# Patient Record
Sex: MALE | Race: OTHER | URBAN - METROPOLITAN AREA
[De-identification: names, ages, dates, MRNs, and addresses within clinical notes are randomized per-mention and may not be internally consistent; named-entity substitution may affect disease eponyms.]

---

## 2024-07-31 ENCOUNTER — EMERGENCY (EMERGENCY)
Facility: HOSPITAL | Age: 54
LOS: 0 days | Discharge: HOME | End: 2024-07-31
Attending: STUDENT IN AN ORGANIZED HEALTH CARE EDUCATION/TRAINING PROGRAM
Payer: MEDICAID

## 2024-07-31 VITALS
OXYGEN SATURATION: 99 % | SYSTOLIC BLOOD PRESSURE: 120 MMHG | TEMPERATURE: 98 F | HEIGHT: 71 IN | DIASTOLIC BLOOD PRESSURE: 82 MMHG | HEART RATE: 77 BPM | RESPIRATION RATE: 16 BRPM | WEIGHT: 195.11 LBS

## 2024-07-31 VITALS
HEART RATE: 66 BPM | RESPIRATION RATE: 18 BRPM | DIASTOLIC BLOOD PRESSURE: 82 MMHG | SYSTOLIC BLOOD PRESSURE: 126 MMHG | OXYGEN SATURATION: 99 %

## 2024-07-31 DIAGNOSIS — E11.9 TYPE 2 DIABETES MELLITUS WITHOUT COMPLICATIONS: ICD-10-CM

## 2024-07-31 DIAGNOSIS — R50.9 FEVER, UNSPECIFIED: ICD-10-CM

## 2024-07-31 DIAGNOSIS — Z79.01 LONG TERM (CURRENT) USE OF ANTICOAGULANTS: ICD-10-CM

## 2024-07-31 DIAGNOSIS — I10 ESSENTIAL (PRIMARY) HYPERTENSION: ICD-10-CM

## 2024-07-31 DIAGNOSIS — E78.5 HYPERLIPIDEMIA, UNSPECIFIED: ICD-10-CM

## 2024-07-31 DIAGNOSIS — Z89.431 ACQUIRED ABSENCE OF RIGHT FOOT: ICD-10-CM

## 2024-07-31 DIAGNOSIS — I25.10 ATHEROSCLEROTIC HEART DISEASE OF NATIVE CORONARY ARTERY WITHOUT ANGINA PECTORIS: ICD-10-CM

## 2024-07-31 DIAGNOSIS — T81.31XA DISRUPTION OF EXTERNAL OPERATION (SURGICAL) WOUND, NOT ELSEWHERE CLASSIFIED, INITIAL ENCOUNTER: ICD-10-CM

## 2024-07-31 DIAGNOSIS — L08.9 LOCAL INFECTION OF THE SKIN AND SUBCUTANEOUS TISSUE, UNSPECIFIED: ICD-10-CM

## 2024-07-31 LAB
ALBUMIN SERPL ELPH-MCNC: 2.6 G/DL — LOW (ref 3.3–5)
ALP SERPL-CCNC: 62 U/L — SIGNIFICANT CHANGE UP (ref 40–120)
ALT FLD-CCNC: 14 U/L — SIGNIFICANT CHANGE UP (ref 12–78)
ANION GAP SERPL CALC-SCNC: 4 MMOL/L — LOW (ref 5–17)
ANISOCYTOSIS BLD QL: SLIGHT — SIGNIFICANT CHANGE UP
AST SERPL-CCNC: 15 U/L — SIGNIFICANT CHANGE UP (ref 15–37)
BASOPHILS # BLD AUTO: 0.01 K/UL — SIGNIFICANT CHANGE UP (ref 0–0.2)
BASOPHILS NFR BLD AUTO: 0.2 % — SIGNIFICANT CHANGE UP (ref 0–2)
BILIRUB SERPL-MCNC: 0.3 MG/DL — SIGNIFICANT CHANGE UP (ref 0.2–1.2)
BUN SERPL-MCNC: 23 MG/DL — SIGNIFICANT CHANGE UP (ref 7–23)
CALCIUM SERPL-MCNC: 9 MG/DL — SIGNIFICANT CHANGE UP (ref 8.5–10.1)
CHLORIDE SERPL-SCNC: 106 MMOL/L — SIGNIFICANT CHANGE UP (ref 96–108)
CO2 SERPL-SCNC: 28 MMOL/L — SIGNIFICANT CHANGE UP (ref 22–31)
CREAT SERPL-MCNC: 1.27 MG/DL — SIGNIFICANT CHANGE UP (ref 0.5–1.3)
CRP SERPL-MCNC: 19 MG/L — HIGH
EGFR: 67 ML/MIN/1.73M2 — SIGNIFICANT CHANGE UP
EOSINOPHIL # BLD AUTO: 0.13 K/UL — SIGNIFICANT CHANGE UP (ref 0–0.5)
EOSINOPHIL NFR BLD AUTO: 2.2 % — SIGNIFICANT CHANGE UP (ref 0–6)
ERYTHROCYTE [SEDIMENTATION RATE] IN BLOOD: 90 MM/HR — HIGH (ref 0–20)
GLUCOSE SERPL-MCNC: 175 MG/DL — HIGH (ref 70–99)
HCT VFR BLD CALC: 27 % — LOW (ref 39–50)
HGB BLD-MCNC: 8.5 G/DL — LOW (ref 13–17)
HYPOCHROMIA BLD QL: SLIGHT — SIGNIFICANT CHANGE UP
IMM GRANULOCYTES NFR BLD AUTO: 0.2 % — SIGNIFICANT CHANGE UP (ref 0–0.9)
LYMPHOCYTES # BLD AUTO: 1.41 K/UL — SIGNIFICANT CHANGE UP (ref 1–3.3)
LYMPHOCYTES # BLD AUTO: 24.4 % — SIGNIFICANT CHANGE UP (ref 13–44)
MACROCYTES BLD QL: SLIGHT — SIGNIFICANT CHANGE UP
MANUAL SMEAR VERIFICATION: SIGNIFICANT CHANGE UP
MCHC RBC-ENTMCNC: 23 PG — LOW (ref 27–34)
MCHC RBC-ENTMCNC: 31.5 G/DL — LOW (ref 32–36)
MCV RBC AUTO: 73.2 FL — LOW (ref 80–100)
MONOCYTES # BLD AUTO: 0.64 K/UL — SIGNIFICANT CHANGE UP (ref 0–0.9)
MONOCYTES NFR BLD AUTO: 11.1 % — SIGNIFICANT CHANGE UP (ref 2–14)
NEUTROPHILS # BLD AUTO: 3.59 K/UL — SIGNIFICANT CHANGE UP (ref 1.8–7.4)
NEUTROPHILS NFR BLD AUTO: 61.9 % — SIGNIFICANT CHANGE UP (ref 43–77)
NRBC # BLD: 0 /100 WBCS — SIGNIFICANT CHANGE UP (ref 0–0)
PLAT MORPH BLD: NORMAL — SIGNIFICANT CHANGE UP
PLATELET # BLD AUTO: 267 K/UL — SIGNIFICANT CHANGE UP (ref 150–400)
POTASSIUM SERPL-MCNC: 3.9 MMOL/L — SIGNIFICANT CHANGE UP (ref 3.5–5.3)
POTASSIUM SERPL-SCNC: 3.9 MMOL/L — SIGNIFICANT CHANGE UP (ref 3.5–5.3)
PROT SERPL-MCNC: 7.9 GM/DL — SIGNIFICANT CHANGE UP (ref 6–8.3)
RBC # BLD: 3.69 M/UL — LOW (ref 4.2–5.8)
RBC # FLD: 17.7 % — HIGH (ref 10.3–14.5)
RBC BLD AUTO: SIGNIFICANT CHANGE UP
SODIUM SERPL-SCNC: 138 MMOL/L — SIGNIFICANT CHANGE UP (ref 135–145)
WBC # BLD: 5.79 K/UL — SIGNIFICANT CHANGE UP (ref 3.8–10.5)
WBC # FLD AUTO: 5.79 K/UL — SIGNIFICANT CHANGE UP (ref 3.8–10.5)

## 2024-07-31 PROCEDURE — 99285 EMERGENCY DEPT VISIT HI MDM: CPT

## 2024-07-31 PROCEDURE — 73630 X-RAY EXAM OF FOOT: CPT | Mod: 26,RT

## 2024-07-31 RX ORDER — AMOXICILLIN/POTASSIUM CLAV 250-125 MG
875 TABLET ORAL
Qty: 20 | Refills: 0
Start: 2024-07-31 | End: 2024-08-09

## 2024-07-31 NOTE — ED ADULT TRIAGE NOTE - CHIEF COMPLAINT QUOTE
Patient presents to ED c/o fever x 2 days. Patient had amputation of all toes to right foot done on June 17 2024. States site is swollen and discolored. Denies any pain or discomfort.  hx DM, HTN. HLD stent on xarelto

## 2024-07-31 NOTE — ED ADULT NURSE NOTE - NSFALLUNIVINTERV_ED_ALL_ED
Bed/Stretcher in lowest position, wheels locked, appropriate side rails in place/Call bell, personal items and telephone in reach/Instruct patient to call for assistance before getting out of bed/chair/stretcher/Non-slip footwear applied when patient is off stretcher/[place] to call system/Physically safe environment - no spills, clutter or unnecessary equipment/Purposeful proactive rounding/Room/bathroom lighting operational, light cord in reach

## 2024-07-31 NOTE — ED PROVIDER NOTE - PHYSICAL EXAMINATION
General appearance: Nontoxic appearing, conversant, afebrile    Eyes: anicteric sclerae, RANDALL, EOMI   HENT: Atraumatic; oropharynx clear, MMM and no ulcerations, no pharyngeal erythema or exudate   Neck: Trachea midline; Full range of motion, supple   Pulm: normal respiratory effort and no intercostal retractions, normal work of breathing   Extremities: No peripheral edema, no gross deformities, FROM x4   Skin: Dry, normal temperature, turgor and texture; R foot wound with sutures in place, small purulent drainage, lateral aspect with some wound dehiscence, no significant erythema or warmth   Psych: Appropriate affect, cooperative

## 2024-07-31 NOTE — ED ADULT NURSE REASSESSMENT NOTE - NS ED NURSE REASSESS COMMENT FT1
Pt AOX3 with steady gait when using a cane. Pt reports no acute distress at this time. Pt accepts the d/c from the MD and IV hep lock removed

## 2024-07-31 NOTE — ED PROVIDER NOTE - PATIENT PORTAL LINK FT
You can access the FollowMyHealth Patient Portal offered by [facility] by registering at the following website: [email or web address]. By joining [facility]’s FollowMyHealth portal, you will also be able to view your health information using other applications (apps) compatible with our system.

## 2024-07-31 NOTE — ED PROVIDER NOTE - NSFOLLOWUPINSTRUCTIONS_ED_ALL_ED_FT
Please follow up with Dr. Gaviria in 1 week.    Antibiotics have been sent to your pharmacy, please take as directed.     Please return to the emergency department if you experience any of the following symptoms:    Fever  Chest pain  Difficulty breathing  Abdominal pain  Nausea  Vomiting

## 2024-07-31 NOTE — ED ADULT NURSE NOTE - OBJECTIVE STATEMENT
Patient states he had an amputation of four toes on the Right foot on 6/17/24. States he has had a fever the past two days and severe pain of right foot 8/10. Denies nausea, vomiting, chills, or night sweats. PMH: Cardiac stent, HTN, HLD, DM2, Right foot toe amputation

## 2024-07-31 NOTE — ED ADULT TRIAGE NOTE - BMI (KG/M2)
every 6 hours as needed for Wheezing, Disp-1 Inhaler, R-3Normal      montelukast (SINGULAIR) 10 MG tablet Take 1 tablet by mouth nightly, Disp-90 tablet, R-3Normal      busPIRone (BUSPAR) 15 MG tablet Take 15 mg by mouth 3 times dailyHistorical Med      cetirizine (ZYRTEC ALLERGY) 10 MG tablet Take 1 tablet by mouth daily, Disp-30 tablet, R-0Print             ALLERGIES     Seroquel [quetiapine fumarate]    FAMILY HISTORY       Family History   Problem Relation Age of Onset    Depression Mother     Alcohol Abuse Mother         recovering    Depression Father         SOCIAL HISTORY       Social History     Social History    Marital status:      Spouse name: N/A    Number of children: N/A    Years of education: N/A     Social History Main Topics    Smoking status: Current Every Day Smoker     Packs/day: 0.50     Years: 7.00     Types: Cigarettes    Smokeless tobacco: Never Used    Alcohol use Yes      Comment: on occasion,     Drug use: No    Sexual activity: Yes     Partners: Female     Other Topics Concern    Not on file     Social History Narrative    No narrative on file       REVIEW OF SYSTEMS       Review of Systems   Constitutional: Negative for fever. Musculoskeletal: Positive for arthralgias. Neurological: Negative for headaches. PHYSICAL EXAM    (up to 7 for level 4, 8 or more for level 5)     ED Triage Vitals [09/08/18 1436]   BP Temp Temp Source Pulse Resp SpO2 Height Weight   (!) 146/94 98.4 °F (36.9 °C) Tympanic 92 16 98 % -- 200 lb (90.7 kg)       Physical Exam   Constitutional: He is oriented to person, place, and time. He appears well-developed. No distress. HENT:   Head: Normocephalic and atraumatic. Eyes: Pupils are equal, round, and reactive to light. Conjunctivae and EOM are normal. Right eye exhibits no discharge. Left eye exhibits no discharge. Neck: Neck supple. No spinous process tenderness and no muscular tenderness present.  No edema, no erythema and normal
27.2

## 2024-07-31 NOTE — ED PROVIDER NOTE - CARE PROVIDER_API CALL
Lanette Gaviria  Foot Surgery  [address]  [place] [address]  Phone: [phone]  Fax: [phone]  Follow Up Time:

## 2024-07-31 NOTE — ED PROVIDER NOTE - PROGRESS NOTE DETAILS
DO Leonora: Spoke with podiatrist who recommends 10-day course of antibiotics and follow-up with Dr. Gaviria in 1 week.

## 2024-07-31 NOTE — CONSULT NOTE ADULT - ASSESSMENT
54M presents with right foot wound  - Patient seen and evaluated  - Afebrile, WBC 5.79, ESR 90, CRP pending  - Right foot TMA stump with lateral and central dehiscence, some sutures intact, central and lateral incision maceration, serosanguinous drainage, no pus, no drainage, no tracking or tunneling, no periwound erythema, no acute signs of infection. Left lateral mal hypopigmentation 2/2 pressure, no open wounds or lesions, no acute signs of infection.  - Right foot xray: no OM, no gas  - Right foot cultured by ED  - Recommend 10 days of Augmentin  - Pt states that he has been noncompliant with non-weight bearing. Explained to patient the importance of remaining non-weight bearing to the R lower extremity as the dehiscence noted is likely due to ambulation. Pt displayed verbal understanding and states that he will not walk on the R lower extremity moving forward  - Instructed patient to dress wound with packing, 4x4 gauze, ABD pad, kerlix/andres and ACE bandage  - Patient is stable for discharge from podiatry standpoint and can follow up outpatient with Dr. Lanette Gaviria [phone] within 1 week of discharge  - Discussed with attending

## 2024-07-31 NOTE — ED PROVIDER NOTE - CLINICAL SUMMARY MEDICAL DECISION MAKING FREE TEXT BOX
55yo male with pmh cad, htn, hld, dm, on xarelto for unknown reason presenting with tactile fever/ chills and R foot wound with drainage.  2 months ago at [facility] patient had amputation of R foot to midfoot 2/2 diabetes.  Has been following with podiatrist and changing dressings at home.  3 days of tactile fevers/ chills.  Noticed some discharge from wound site and missing sutures.  No other cough, congestion, ha, abd pain, sob, cp, n/v/d, urinary symptoms.  Plan for labs with cultures.  Eval wound for infection.

## 2024-07-31 NOTE — CONSULT NOTE ADULT - SUBJECTIVE AND OBJECTIVE BOX
Patient is a 54y old  Male who presents with a chief complaint of     HPI:  53yo male with pmh cad, htn, hld, dm, on xarelto for unknown reason presenting with tactile fever/ chills and R foot wound with drainage.  2 months ago at [facility] patient had amputation of R foot to midfoot 2/2 diabetes.  Has been following with podiatrist and changing dressings at home.  3 days of tactile fevers/ chills.  Noticed some discharge from wound site and missing sutures.  No other cough, congestion, ha, abd pain, sob, cp, n/v/d, urinary symptoms.  Plan for labs with cultures.  Eval wound for infection.53yo male with pmh cad, htn, hld, dm, on xarelto for unknown reason presenting with tactile fever/ chills and R foot wound with drainage.  2 months ago at [facility] patient had amputation of R foot to midfoot 2/2 diabetes.  Has been following with podiatrist and changing dressings at home.  3 days of tactile fevers/ chills.  Noticed some discharge from wound site and missing sutures.  No other cough, congestion, ha, abd pain, sob, cp, n/v/d, urinary symptoms.  Plan for labs with cultures.  Eval wound for infection.    PAST MEDICAL & SURGICAL HISTORY:      MEDICATIONS  (STANDING):    MEDICATIONS  (PRN):      Allergies    No Known Allergies    Intolerances        VITALS:    Vital Signs Last 24 Hrs  T(C): 36.7 (31 Jul 2024 16:30), Max: 36.7 (31 Jul 2024 16:30)  T(F): 98 (31 Jul 2024 16:30), Max: 98 (31 Jul 2024 16:30)  HR: 68 (31 Jul 2024 16:30) (68 - 77)  BP: 123/76 (31 Jul 2024 16:30) (120/82 - 123/76)  BP(mean): --  RR: 18 (31 Jul 2024 16:30) (16 - 18)  SpO2: 96% (31 Jul 2024 16:30) (96% - 99%)    Parameters below as of 31 Jul 2024 16:30  Patient On (Oxygen Delivery Method): room air        LABS:                          8.5    5.79  )-----------( 267      ( 31 Jul 2024 13:20 )             27.0       07-31    138  |  106  |  23  ----------------------------<  175<H>  3.9   |  28  |  1.27    Ca    9.0      31 Jul 2024 13:20    TPro  7.9  /  Alb  2.6<L>  /  TBili  0.3  /  DBili  x   /  AST  15  /  ALT  14  /  AlkPhos  62  07-31      CAPILLARY BLOOD GLUCOSE              LOWER EXTREMITY PHYSICAL EXAM:    Vascular: DP/PT 2/4, B/L, CFT <3 seconds B/L, Temperature gradient warm to cool, B/L.   Neuro: Epicritic sensation diminished to the level of digits, B/L.  Musculoskeletal/Ortho: s/p right foot transmetatarsal amputation (TMA)  Skin: Right foot TMA stump with lateral and central dehiscence some sutures intact, central and lateral incision maceration, serosanguinous drainage, no pus, no drainage, no tracking or tunneling, no periwound erythema, no acute signs of infection. Left lateral mal hypopigmentation 2/2 pressure, no open wounds or lesions, no acute signs of infection.      RADIOLOGY & ADDITIONAL STUDIES:  < from: Xray Foot AP + Lateral + Oblique, Right (07.31.24 @ 13:46) >  CC: [id number] EXAM:  XR FOOT COMP MIN 3 VIEWS RT   ORDERED BY: Sha Cassidy     PROCEDURE DATE:  07/31/2024          INTERPRETATION:  3 views of the right foot were obtained for history of   amputation.    There are no prior studies for comparison.    There is amputation beyond the base of the first through fifth   metatarsals with soft tissue swelling along with soft tissue gas. In the   immediate postoperative period this is consistent with typical   postoperative change however long-term an underlying soft tissue   infection cannot be excluded. There is a plantar heel spur.    IMPRESSION:  1. Postoperative changes including amputation beyond the base of the   first through fifth metatarsals and includes soft tissue swelling along   with soft tissue gas. If the surgery has been recent, this would be   consistent with postoperative change or alternatively could represent a   soft tissue infection if the prior surgery is remote.  2. No evidence of acute fracture, dislocation or specific evidence of   osteomyelitis.  3. Plantar heel spur.    --- End of Report ---            Jorge Luis Pritchett MD  This document has been electronically signed. Jul 31 2024  2:17PM    < end of copied text >

## 2024-08-05 LAB
CULTURE RESULTS: SIGNIFICANT CHANGE UP
CULTURE RESULTS: SIGNIFICANT CHANGE UP
SPECIMEN SOURCE: SIGNIFICANT CHANGE UP
SPECIMEN SOURCE: SIGNIFICANT CHANGE UP

## 2024-12-17 NOTE — ED ADULT TRIAGE NOTE - PATIENT'S PREFERRED PRONOUN
Subjective:       Patient ID: Jaspal Trujillo Jr. is a 64 y.o. male.    Chief Complaint: Back Pain    63 y/o w hx of being unable to work  bec/ of chronic back pain and is unable  to sit for any  period of time    Back Pain  This is a chronic problem. The current episode started more than 1 year ago. The problem occurs 2 to 4 times per day. The problem has been gradually worsening since onset. The pain is present in the lumbar spine and sacro-iliac. The quality of the pain is described as aching, burning and stabbing. The pain radiates to the right thigh. The pain is at a severity of 5/10. The pain is moderate. The pain is Worse during the day. The symptoms are aggravated by position, sitting, standing and twisting. Stiffness is present All day. Associated symptoms include headaches, numbness, paresthesias, tingling and weakness. Pertinent negatives include no abdominal pain, bladder incontinence, bowel incontinence, chest pain, dysuria, fever, leg pain, paresis, pelvic pain, perianal numbness or weight loss. Risk factors include lack of exercise, obesity and recent trauma. The treatment provided mild relief.     Review of Systems   Constitutional:  Negative for fever and weight loss.   Cardiovascular:  Negative for chest pain.   Gastrointestinal:  Negative for abdominal pain and bowel incontinence.   Genitourinary:  Negative for bladder incontinence, dysuria, hematuria and pelvic pain.   Musculoskeletal:  Positive for back pain.   Neurological:  Positive for tingling, weakness, numbness, headaches and paresthesias.       Objective:      Physical Exam  Constitutional:       Appearance: He is well-developed.   HENT:      Head: Normocephalic.   Eyes:      Pupils: Pupils are equal, round, and reactive to light.   Neck:      Thyroid: No thyromegaly.   Cardiovascular:      Rate and Rhythm: Normal rate and regular rhythm.      Heart sounds: No murmur heard.     No friction rub.   Pulmonary:      Effort: Pulmonary effort  is normal. No respiratory distress.      Breath sounds: No wheezing.   Abdominal:      Tenderness: There is no abdominal tenderness. There is no guarding or rebound.   Genitourinary:     Comments: CYNTHIA deferred  Musculoskeletal:         General: No tenderness. Normal range of motion.      Cervical back: Normal range of motion and neck supple.   Lymphadenopathy:      Cervical: No cervical adenopathy.   Skin:     General: Skin is warm and dry.   Neurological:      Mental Status: He is alert and oriented to person, place, and time.      Cranial Nerves: No cranial nerve deficit.      Deep Tendon Reflexes: Reflexes are normal and symmetric.   Psychiatric:         Thought Content: Thought content normal.         Judgment: Judgment normal.       Assessment:       Encounter Diagnoses   Name Primary?    Screening for prostate cancer Yes    Hyperlipidemia, unspecified hyperlipidemia type     Chronic midline low back pain, unspecified whether sciatica present          Plan:   1. Screening for prostate cancer  -     PSA, Screening; Future; Expected date: 12/17/2024    2. Hyperlipidemia, unspecified hyperlipidemia type  -     CBC Auto Differential; Future; Expected date: 12/17/2024  -     Comprehensive Metabolic Panel; Future; Expected date: 12/17/2024  -     Lipid Panel; Future; Expected date: 12/17/2024    3. Chronic midline low back pain, unspecified whether sciatica present  -     carisoprodoL (SOMA) 350 MG tablet; Take 1 tablet (350 mg total) by mouth every evening. for 10 days  Dispense: 30 tablet; Refill: 0          Him/He